# Patient Record
(demographics unavailable — no encounter records)

---

## 2024-12-05 NOTE — PHYSICAL EXAM

## 2024-12-05 NOTE — HISTORY OF PRESENT ILLNESS
[Mother] : mother [Normal] : Normal [___ stools per day] : [unfilled]  stools per day [Vitamin] : Primary Fluoride Source: Vitamin [In nursery school] : In nursery school [No] : Not at  exposure [Car seat in back seat] : Car seat in back seat [FreeTextEntry7] : Patient has been well [de-identified] : Regular diet

## 2024-12-05 NOTE — DISCUSSION/SUMMARY
[Normal Growth] : growth [Normal Development] : development [None] : No known medical problems [No Elimination Concerns] : elimination [No Feeding Concerns] : feeding [No Skin Concerns] : skin [Normal Sleep Pattern] : sleep [Assessment of Language Development] : assessment of language development [Temperament and Behavior] : temperament and behavior [Toilet Training] : toilet training [TV Viewing] : tv viewing [Safety] : safety [No Medications] : ~He/She~ is not on any medications [Parent/Guardian] : parent/guardian [] : The components of the vaccine(s) to be administered today are listed in the plan of care. The disease(s) for which the vaccine(s) are intended to prevent and the risks have been discussed with the caretaker.  The risks are also included in the appropriate vaccination information statements which have been provided to the patient's caregiver.  The caregiver has given consent to vaccinate. [FreeTextEntry1] : Discussed patient's growth and development. Immunization given and side effects discussed. Return to office for  next well  or p.r.n.. Parent understand the plan

## 2024-12-05 NOTE — DEVELOPMENTAL MILESTONES
[Normal Development] : Normal Development [None] : none [Plays pretend with toys or dolls] : plays pretend with toys or dolls [Pokes food with fork] : pokes food with fork [Names at least one color] : names at least one color [Walks up steps, using one] : walks up steps, using one foot, then the other [Runs well without falling] : runs well without falling [Yes] : Completed.

## 2024-12-21 NOTE — HISTORY OF PRESENT ILLNESS
[FreeTextEntry6] : Pt presents with  mom. Mom states that Sayra has had some congestion, cough and cold symptoms for the last few days. Today her right eye is red, swollen and has some discharge coming out of it. states it keeps oozing all morning. \ Denies any fevers or any other concerns at this time.

## 2024-12-21 NOTE — DISCUSSION/SUMMARY
[FreeTextEntry1] : Rx eye drops sent to pharmacy. Instructed to use only in right eye. Clean eye with a warm damp compress. Clean down and away from eye.  Hand hygiene discussed. May change pillow case. Conjunctivitis is likely from viral illness.  Apply aquaphor to skin daily at night to help with excoriation.  Exposure to steamy bathroom and cool mist humidifier at night to help with conjestion.  All questions answered, reassurance provided. Instructed to follow up as needed with any questions, concerns or worsening symptoms.

## 2024-12-21 NOTE — REVIEW OF SYSTEMS
[Eye Discharge] : eye discharge [Eye Redness] : eye redness [Nasal Discharge] : nasal discharge [Nasal Congestion] : nasal congestion [Negative] : Genitourinary

## 2024-12-21 NOTE — PHYSICAL EXAM
[Conjuctival Injection] : no conjunctival injection [Discharge] : discharge [Eyelid Swelling] : eyelid swelling [Clear Rhinorrhea] : clear rhinorrhea [Inflamed Nasal Mucosa] : inflamed nasal mucosa [NL] : moves all extremities x4, warm, well perfused x4 [FreeTextEntry5] : right eye  [de-identified] : dry red excoriated skin on face from runny nose etc.

## 2025-02-01 NOTE — HISTORY OF PRESENT ILLNESS
[FreeTextEntry6] : + URI and cough x 5 days   no fevers good po,uo, sleep no v,d  last night noted hive rash on buttocks legs and face   Benadryl given w relief of hives   family was eating a shellfish dinner, pt  not eat any shellfish

## 2025-02-01 NOTE — DISCUSSION/SUMMARY
[FreeTextEntry1] : photos by mom of pt w hives from last night was noted  advised Benadryl po q 4-6 hr prn hives   over next few days   to f/u if hives not responding to Benadry or fever develop

## 2025-05-22 NOTE — DEVELOPMENTAL MILESTONES
[Normal Development] : Normal Development [None] : none [Goes to the bathroom and urinates] : does not go to bathroom and urinates by self [Plays and shares with others] : plays and shares with others [Put on coat, jacket, or shirt by self] : puts on coat, jacket, or shirt by self [Uses 3-word sentences] : uses 3-word sentences [Jumps forward] : jumps forward

## 2025-05-22 NOTE — HISTORY OF PRESENT ILLNESS
[Mother] : mother [Normal] : Normal [Brushing teeth] : Brushing teeth [Yes] : Patient goes to dentist yearly [Vitamin] : Primary Fluoride Source: Vitamin [In nursery school] : In nursery school [Appropiate parent-child communication] : Appropriate parent-child communication [Child given choices] : Child given choices [Parent has appropriate responses to behavior] : Parent has appropriate responses to behavior [No] : Not at  exposure [Car seat in back seat] : Car seat in back seat [FreeTextEntry7] : Patient has been well [de-identified] : Picky eater

## 2025-05-22 NOTE — DISCUSSION/SUMMARY
[Normal Growth] : growth [Normal Development] : development [None] : No known medical problems [No Elimination Concerns] : elimination [No Feeding Concerns] : feeding [No Skin Concerns] : skin [Normal Sleep Pattern] : sleep [Family Support] : family support [Encouraging Literacy Activities] : encouraging literacy activities [Playing with Peers] : playing with peers [Promoting Physical Activity] : promoting physical activity [Safety] : safety [No Medications] : ~He/She~ is not on any medications [Parent/Guardian] : parent/guardian [FreeTextEntry1] : Discussed patient's growth and development. Immunizations discussed. Return to office for  next well  or p.r.n.. Parent understand the plan